# Patient Record
(demographics unavailable — no encounter records)

---

## 2024-10-08 NOTE — IMAGING
[de-identified] : Constitutional: well developed and well nourished, able to communicate\par  Cardiovascular: Peripheral vascular exam is grossly normal\par  Neurologic: Alert and oriented, no acute distress.\par  Skin: normal skin with no ulcers, rashes, or lesions\par  Pulmonary: No respiratory distress, breathing comfortably on room air\par  Lymphatics: No obvious lymphadenopathy or lymphedema in areas examined\par  \par  BILATERAL KNEE EXAM\par  Alignment: Neutral \par  Effusion: None\par  Atrophy: None                                                 \par  Stable to Varus/valgus stress\par  Posterior Drawer Test: negative\par  Anterior Drawer Test: Negative\par  Knee Extension/Flexion: 0 / 120\par  \par  Medial/lateral compartments\par  Medial joint line: No Tenderness\par  Lateral joint line: No Tenderness\par  Jayne test: negative\par  \par  Patellofemoral joint\par  Medial patellar facet: no tenderness\par  Patellar grind: Negative\par  \par  Tendons:\par  Pes Anserine: No tenderness\par  Gerdy's Tubercle/ IT Band: No tenderness\par  Quadriceps Tendon: No Tenderness\par  patellar tendon: no Tenderness\par  Tibial tubercle: not tenderness\par  Calf: no Tenderness\par  \par  Neurovascular exam\par  Muscle strength: 5/5\par  Sensation to light touch: intact\par  Distal pulses: 2+\par  \par  IMAGING:\par  06/16/2023 Xrays of the Left Knee were taken demonstrating medial compartment narrowing left knee compartment. PF OA

## 2024-10-08 NOTE — HISTORY OF PRESENT ILLNESS
[Gradual] : gradual [2] : 2 [de-identified] : 06/16/2023 Mr. CAMMY LINDSAY is a 66 year male that comes in today with a chief complaint of Fernandez knee pt states he had surgery on the left knee 10 years ago.  07/28/2023 follow up injection. Here for inj #1 fernandez knee 08/04/2023 follow up injection. #2 08/11/2023 follow up injection #3  08/18/2023 follow up injection #   07/25/2024 Pt is here for fu of Lt knee. Pt is having sharp pain anteriorly; says pain goes away when he is active/running. No numbness or tingling. Previous inj in fernandez knee; last inj 8/18/23.    09/10/2024: pt is here today for injection #1 fernandez knees orthovisc.  9/17/24 pt is here for injection #2 fernandez knees orthovisc.  10/01/2024: Bilateral knee orthovisc 3 of 4  10/8/24 pt is here for INJ orthovisc 4 of 4  [] : no

## 2024-10-08 NOTE — ASSESSMENT
[FreeTextEntry1] : 66 year M with L>R knee OA CSI in the past with hx of arthroscopy. will submit for bilateral gel injections orthovisc follow after approval  7/28/23: Orthovisc 1 of 4, follow up in one week for inj #2  8/4/23: Orthovisc 2 of 4, follow up in one week for inj # 3  8/11/23: Orthovisc 3 of 4, follow up in one week for inj #4  07/25/2024: Bilateral knee OA Orthovisc submitted auth Follow up once auth submitted  09/10/2024: Bilateral knee OA  Orthovisc 1 of 4 Follow up in one week for inj #2   09/17/2024: Bilateral knee OA Orthovisc 2 of 4 Follow up in one week for inj #3  10/01/2024: Bilateral knee OA Orthovisc 3 of 4 Follow up in one week for inj #4  10/08/2024: Bilateral knee OA Orthovisc 4 of 4 Follow up PRN

## 2024-10-08 NOTE — PROCEDURE
[FreeTextEntry3] : The risks, benefits and alternatives to the injection were discussed with the patient. The decision was made to proceed with the injection to reduce inflammation within the area. Verbal consent was obtained for the procedure. The bilateral knees were cleaned with alcohol and ethyl chloride was sprayed topically.  Orthovisc 4 of 4 was injected. The patient tolerated the procedure well and was instructed to ice the affected joint as needed later today. Activities can be performed as tolerated.

## 2024-11-12 NOTE — PHYSICAL EXAM
[General Appearance - Alert] : alert [General Appearance - In No Acute Distress] : in no acute distress [General Appearance - Well Nourished] : well nourished [General Appearance - Well Developed] : well developed [Sclera] : the sclera and conjunctiva were normal [PERRL With Normal Accommodation] : pupils were equal in size, round, and reactive to light [Outer Ear] : the ears and nose were normal in appearance [Hearing Threshold Finger Rub Not [place]] : hearing was normal [Examination Of The Oral Cavity] : the lips and gums were normal [Neck Appearance] : the appearance of the neck was normal [Thyroid Diffuse Enlargement] : the thyroid was not enlarged [Exaggerated Use Of Accessory Muscles For Inspiration] : no accessory muscle use [Respiration, Rhythm And Depth] : normal respiratory rhythm and effort [Auscultation Breath Sounds / Voice Sounds] : lungs were clear to auscultation bilaterally [Heart Rate And Rhythm] : heart rate was normal and rhythm regular [Heart Sounds] : normal S1 and S2 [Heart Sounds Gallop] : no gallops [Murmurs] : no murmurs [Arterial Pulses Carotid] : carotid pulses were normal with no bruits [Edema] : there was no peripheral edema [Bowel Sounds] : normal bowel sounds [Abdomen Soft] : soft [Abdomen Tenderness] : non-tender [No CVA Tenderness] : no ~M costovertebral angle tenderness [No Spinal Tenderness] : no spinal tenderness [Abnormal Walk] : normal gait [Nail Clubbing] : no clubbing  or cyanosis of the fingernails [Musculoskeletal - Swelling] : no joint swelling seen [Motor Tone] : muscle strength and tone were normal [Skin Color & Pigmentation] : normal skin color and pigmentation [] : no rash [Skin Lesions] : no skin lesions [Impaired Insight] : insight and judgment were intact [Oriented To Time, Place, And Person] : oriented to person, place, and time [Affect] : the affect was normal [Mood] : the mood was normal

## 2024-11-12 NOTE — PHYSICAL EXAM
[General Appearance - Alert] : alert [General Appearance - In No Acute Distress] : in no acute distress [General Appearance - Well Nourished] : well nourished [General Appearance - Well Developed] : well developed [Sclera] : the sclera and conjunctiva were normal [PERRL With Normal Accommodation] : pupils were equal in size, round, and reactive to light [Outer Ear] : the ears and nose were normal in appearance [Hearing Threshold Finger Rub Not [place]] : hearing was normal [Examination Of The Oral Cavity] : the lips and gums were normal [Neck Appearance] : the appearance of the neck was normal [Thyroid Diffuse Enlargement] : the thyroid was not enlarged [Respiration, Rhythm And Depth] : normal respiratory rhythm and effort [Exaggerated Use Of Accessory Muscles For Inspiration] : no accessory muscle use [Auscultation Breath Sounds / Voice Sounds] : lungs were clear to auscultation bilaterally [Heart Rate And Rhythm] : heart rate was normal and rhythm regular [Heart Sounds] : normal S1 and S2 [Heart Sounds Gallop] : no gallops [Murmurs] : no murmurs [Arterial Pulses Carotid] : carotid pulses were normal with no bruits [Edema] : there was no peripheral edema [Bowel Sounds] : normal bowel sounds [Abdomen Soft] : soft [Abdomen Tenderness] : non-tender [No CVA Tenderness] : no ~M costovertebral angle tenderness [No Spinal Tenderness] : no spinal tenderness [Abnormal Walk] : normal gait [Nail Clubbing] : no clubbing  or cyanosis of the fingernails [Musculoskeletal - Swelling] : no joint swelling seen [Motor Tone] : muscle strength and tone were normal [Skin Color & Pigmentation] : normal skin color and pigmentation [] : no rash [Skin Lesions] : no skin lesions [Impaired Insight] : insight and judgment were intact [Oriented To Time, Place, And Person] : oriented to person, place, and time [Affect] : the affect was normal [Mood] : the mood was normal

## 2024-11-12 NOTE — ASSESSMENT
[FreeTextEntry1] : Mr. Reyna returns for follow up CKD. He is a 68 yo male with DM, HTN, CKD 3a.  1. CKD: He has CKD 3a likely secondary to long-standing DM, and HTN.   2. RASHEED - He now has RASHEED with Cr 1.6 recently; likely hemodynamic mediated on the setting of volume depletion, lisinopril use, and relative hypotension. Renal US without hydronephrosis.  - Will repeat CMP today as he has been off of lisinopril    2. Proteinuria: In the setting of longstanding DM, HTN and obesity though he has lost 55 Ibs in the past few months and has had wide weight fluctuations. Lisinopril recently stopped due to orthostasis and hypotension. - check uPCR. UA - advised maintaining normal BMI  3. HTN: He was on lisinopril but recently stopped due to symptomatic hypotension likely in the setting of significant weight loss. - advised to monitor home BP  - advised maintaining normal BMI  4. Renal cysts: Apparently had renal US which showed bilateral renal cysts with the left kidney cyst at 4.2 cm. - advised the patient to discuss results with his urologist.  F/U in 6 months

## 2024-11-12 NOTE — ASSESSMENT
[FreeTextEntry1] : Mr. Reyna returns for follow up CKD. He is a 66 yo male with DM, HTN, CKD 3a.  1. CKD: He has CKD 3a likely secondary to long-standing DM, and HTN.   2. RASHEED - He now has RASHEED with Cr 1.6 recently; likely hemodynamic mediated on the setting of volume depletion, lisinopril use, and relative hypotension. Renal US without hydronephrosis.  - Will repeat CMP today as he has been off of lisinopril    2. Proteinuria: In the setting of longstanding DM, HTN and obesity though he has lost 55 Ibs in the past few months and has had wide weight fluctuations. Lisinopril recently stopped due to orthostasis and hypotension. - check uPCR. UA - advised maintaining normal BMI  3. HTN: He was on lisinopril but recently stopped due to symptomatic hypotension likely in the setting of significant weight loss. - advised to monitor home BP  - advised maintaining normal BMI  4. Renal cysts: Apparently had renal US which showed bilateral renal cysts with the left kidney cyst at 4.2 cm. - advised the patient to discuss results with his urologist.  F/U in 6 months

## 2024-11-12 NOTE — REVIEW OF SYSTEMS
[Negative] : Heme/Lymph [Heart Rate Is Slow] : the heart rate was not slow [Heart Rate Is Fast] : the heart rate was not fast [Chest Pain] : no chest pain [Palpitations] : no palpitations [Lower Ext Edema] : no extremity edema [FreeTextEntry5] : orthostasis resolved after stopping lisiniopril

## 2024-11-12 NOTE — HISTORY OF PRESENT ILLNESS
[FreeTextEntry1] : Follow up CKD/ proteinuria    68 yo male diagnosed with DM ( for only 2 years, no retinopathy, no neuropathy and labs much improved after significant weight loss), HTN, Hyperlipidemia. His creatinine has fluctuated between 1.2-1.5 mg/dl depending on his intake of Advil/ volume status and intermittent fasting.   USG showed a right kidney- 9.9 cm by 6.3 cm, a small cyst in the mid-right kidney measuring 0.8 by 0.6 by 0.5 cm. + a stone in the upper pole of the left kidney. 0.4 by 0.4 by 0.3 cm, left Kidney- 8.5 cm by 3.5 cm.  At the last visit, he had not seen me for about a year. He was in [place] for 2 weeks and gained a significant amount of weight. Additionally, he did not take his antihypertensives on vacation and today is his first day of taking them.   He saw his PCP in March and got labs - Creatinine was 1.2 mg/dl. Overall feels well. has started running again and feels good. has lost ~15 lbs.  Since the last visit: He has restricted his calorie intake, eating only once a day, and has lost 55 lbs  He admits that he may have been "dehydrated" at times He has had episodes of orthostasis and hypotension on lisinopril  Lisinopril stopped 3 weeks ago Uses NSAIDs ~ 2-3x/year  He was also diagnosed with BPH, started Started Alfuzosin 4 days ago Denies acute symptoms, complaints, acute illness, hospitalizations   11/2024:  New BPH diagnosis  Nocturia1-2/night    Off of lisinopril 3 weeks ago due to hypotension, orthostasis Ozempic   9/2024 Cr 1.59, eGFR 47% - (1.25 in 3/2024) uPCR 148 UA - 1+ protein, no blood, 2+ ketones  Renal US - 9/13//2024 ([facility]) Right kidney 12.3, left kidney 10.8, no hydro, mass, calculus bilaterally, 1.1 cm cyst in right kidney, 4.2 cm cyst in left kidney

## 2024-11-12 NOTE — HISTORY OF PRESENT ILLNESS
[FreeTextEntry1] : Follow up CKD/ proteinuria    68 yo male diagnosed with DM ( for only 2 years, no retinopathy, no neuropathy and labs much improved after significant weight loss), HTN, Hyperlipidemia. His creatinine has fluctuated between 1.2-1.5 mg/dl depending on his intake of Advil/ volume status and intermittent fasting.   USG showed a right kidney- 9.9 cm by 6.3 cm, a small cyst in the mid-right kidney measuring 0.8 by 0.6 by 0.5 cm. + a stone in the upper pole of the left kidney. 0.4 by 0.4 by 0.3 cm, left Kidney- 8.5 cm by 3.5 cm.  At the last visit, he had not seen me for about a year. He was in [place] for 2 weeks and gained a significant amount of weight. Additionally, he did not take his antihypertensives on vacation and today is his first day of taking them.   He saw his PCP in March and got labs - Creatinine was 1.2 mg/dl. Overall feels well. has started running again and feels good. has lost ~15 lbs.  Since the last visit: He has restricted his calorie intake, eating only once a day, and has lost 55 lbs  He admits that he may have been "dehydrated" at times He has had episodes of orthostasis and hypotension on lisinopril  Lisinopril stopped 3 weeks ago Uses NSAIDs ~ 2-3x/year  He was also diagnosed with BPH, started Started Alfuzosin 4 days ago Denies acute symptoms, complaints, acute illness, hospitalizations   11/2024:  New BPH diagnosis  Nocturia1-2/night    Off of lisinopril 3 weeks ago due to hypotension, orthostasis Ozempic   9/2024 Cr 1.59, eGFR 47% - (1.25 in 3/2024) uPCR 148 UA - 1+ protein, no blood, 2+ ketones  Renal US - 9/13//2024 ([facility]) Right kidney 12.3, left kidney 10.8, no hydro, mass, calculus bilaterally, 1.1 cm cyst in right kidney, 4.2 cm cyst in left kidney

## 2025-03-07 NOTE — CONSULT LETTER
[Dear  ___] : Dear  [unfilled], [Consult Letter:] : I had the pleasure of evaluating your patient, [unfilled]. [Please see my note below.] : Please see my note below. [Consult Closing:] : Thank you very much for allowing me to participate in the care of this patient.  If you have any questions, please do not hesitate to contact me. [Sincerely,] : Sincerely, [FreeTextEntry3] : Yadira Crespo MD Otolaryngology and Cranial Base Surgery Attending Physician - Department of Otolaryngology and Head & Neck Surgery [facility]  [employer] at [facility]

## 2025-03-07 NOTE — END OF VISIT
[FreeTextEntry3] : I personally saw and examined Mr. CAMMY LINDSAY in detail this visit today. I personally reviewed the HPI, PMH, FH, SH, ROS and medications/allergies. I have spoken to IZABELA Clay regarding the history and have personally determined the assessment and plan of care, and documented this myself. I was present and participated in all key portions of the encounter and all procedures noted above. I have made changes in the body of the note where appropriate.   Attesting Faculty: See Attending Signature Below

## 2025-03-07 NOTE — HISTORY OF PRESENT ILLNESS
[de-identified] : 69y/o male who came in with hx of wax build up and SNHL is here to have his ears cleaned. He had a hearing test within the past year.  Pt denies any ear pain, drainage, tinnitus or worsening hearing loss.

## 2025-03-07 NOTE — ASSESSMENT
[FreeTextEntry1] : 69y/o male who came in with hx of wax build up and SNHL is here to have his ears cleaned  Ear exam: Bilateral wax -Wax removed from bilateral ear canals -Hearing is stable defers audio, last audio with in the year -F/u in 6 months

## 2025-03-07 NOTE — PROCEDURE
[FreeTextEntry3] : Cerumen impaction removed with curette and forceps. After removal of cerumen canal noted to be normal without edema, purulence or inflammation. Patient tolerated procedure well.

## 2025-04-01 NOTE — HISTORY OF PRESENT ILLNESS
[de-identified] : Date of Injury/Onset: 1 month ago Pain: At Rest: 1 With Activity: 4 Mechanism of injury: This is NOT a Work Related Injury being treated under Worker's Compensation. This is NOT an athletic injury occurring associated with an interscholastic or organized sports team. Quality of symptoms: Improves with: Worse with: Prior treatment: Prior Imaging: n/a Reports Available For Review Today: no Out of work/sport: working School/Sport/Position/Occupation:   04/01/2025 CAMMY he is here today for evaluation of left wrist pain for over 1 month ago after lifting some heavy box (50lbs). This is his first evaluation. Patient denies N/T or weakness. Pain indicated to dorsal aspect of left wrist, 5/10, intermittent, achy and sharp at times. Worsening symptoms when flexing or bending hand to point affecting quality of life.   Patient had been wearing a wrist brace, current on steroid pack for gout flare up recent lost >100lbs, planned weight loss.  pmhx gout, htn, low testosterone, HLD, DM (diet only), r

## 2025-04-01 NOTE — DISCUSSION/SUMMARY
[de-identified] : 68 y M RHD L wrist pain x 4 weeks Rec: MR L wrist, cock up wrist splint, mobnathen  FU with Dr. Ontiveros or Armando

## 2025-05-21 NOTE — HISTORY OF PRESENT ILLNESS
[de-identified] : 5/21/25: 69yo male (RHD. ) presents for LEFT wrist pain after lifting a 50lb box ~2 months ago. Saw Dr Estevez on 4/1/25 => XR of L wrist, ordered MRI, wrist brace, Mobic. Pain has improved, c/o pain with heavy lifting.  MRI of L wrist @[facility] 5/2/25 - IMPRESSION: - Full thickness tear of the dorsal scapholunate ligament component without widening of the scapholunate interval. - Full thickness tear of the TFC disc. - Large distal radioulnar joint effusion.  Hx: T2DM - diet controlled. CKD. HLD. Gout. Low testosterone. Sx: L knee PMM (Dr Domingo Rivera 2019). [FreeTextEntry5] : CAMMY hanson [RHD] 68 year old male is here today for evaluation of LEFT wrist pain x 2 months after lifting a 50lbs box. saw Herb 04/01/25 +xrays and MRI @[facility]. wore brace until 2 weeks ago. c/o pain at dorsal wrist with flexion and extension.

## 2025-05-21 NOTE — ASSESSMENT
[FreeTextEntry1] : I independently reviewed and interpreted outside MRI of L wrist - significant for scapholunate ligament tear with DISI deformity. The condition was explained to the patient. Symptoms have improved and are tolerable at this time. Patient declined CSI for pain relief.  - wrist brace PRN pain. - pain guided activity modification. - OTC pain medication PRN pain.  F/u PRN.

## 2025-05-21 NOTE — IMAGING
[de-identified] : LEFT HAND skin intact. mild swelling of wrist. no TTP. wrist ROM: limited extension, flexion. good pronation, supination. + pain with axial load in extension. pain at end flexion. good EPL, FPL. fingers good extension, flex to full fist. good finger abduction and adduction. SILT to median, ulnar, radial distribution. brisk cap refill all digits. no triggering.  Pena's maneuver => no pain. no instability.  @4/1/25 XRAYS OF LEFT WRIST (3 views - PA, OBLIQUE, AND LATERAL VIEWS): no acute displaced fracture or dislocation. DISI deformity. small calcification adjacent to ulnar styloid.

## 2025-05-23 NOTE — PHYSICAL EXAM
[General Appearance - Alert] : alert [General Appearance - In No Acute Distress] : in no acute distress [General Appearance - Well Nourished] : well nourished [General Appearance - Well Developed] : well developed [Sclera] : the sclera and conjunctiva were normal [PERRL With Normal Accommodation] : pupils were equal in size, round, and reactive to light [Outer Ear] : the ears and nose were normal in appearance [Hearing Threshold Finger Rub Not [place]] : hearing was normal [Examination Of The Oral Cavity] : the lips and gums were normal [Neck Appearance] : the appearance of the neck was normal [Thyroid Diffuse Enlargement] : the thyroid was not enlarged [Respiration, Rhythm And Depth] : normal respiratory rhythm and effort [Exaggerated Use Of Accessory Muscles For Inspiration] : no accessory muscle use [Auscultation Breath Sounds / Voice Sounds] : lungs were clear to auscultation bilaterally [Heart Rate And Rhythm] : heart rate was normal and rhythm regular [Heart Sounds] : normal S1 and S2 [Heart Sounds Gallop] : no gallops [Murmurs] : no murmurs [Arterial Pulses Carotid] : carotid pulses were normal with no bruits [Edema] : there was no peripheral edema [Bowel Sounds] : normal bowel sounds [Abdomen Soft] : soft [Abdomen Tenderness] : non-tender [No CVA Tenderness] : no ~M costovertebral angle tenderness [No Spinal Tenderness] : no spinal tenderness [Abnormal Walk] : normal gait [Nail Clubbing] : no clubbing  or cyanosis of the fingernails [Musculoskeletal - Swelling] : no joint swelling seen [Motor Tone] : muscle strength and tone were normal [Skin Color & Pigmentation] : normal skin color and pigmentation [] : no rash [Skin Lesions] : no skin lesions [Oriented To Time, Place, And Person] : oriented to person, place, and time [Impaired Insight] : insight and judgment were intact [Affect] : the affect was normal [Mood] : the mood was normal

## 2025-05-23 NOTE — HISTORY OF PRESENT ILLNESS
[FreeTextEntry1] : Follow up CKD/ proteinuria    68 year old male diagnosed with DM ( for only 2 years, no retinopathy, no neuropathy and labs much improved after significant weight loss), HTN, Hyperlipidemia. His creatinine has fluctuated between 1.2-1.5 mg/dl depending on his intake of Advil/ volume status and intermittent fasting.   USG showed a right kidney- 9.9 cm by 6.3 cm, a small cyst in the mid-right kidney measuring 0.8 by 0.6 by 0.5 cm. + a stone in the upper pole of the left kidney. 0.4 by 0.4 by 0.3 cm, left Kidney- 8.5 cm by 3.5 cm.  At the last visit, he had not seen me for about a year. He was in [place] for 2 weeks and gained a significant amount of weight. Additionally, he did not take his antihypertensives on vacation and today is his first day of taking them.   He saw his PCP in March and got labs - Creatinine was 1.2 mg/dl. Overall feels well. has started running again and feels good. has lost ~15 lbs.  Since the last visit: He has restricted his calorie intake, eating only once a day, and has lost 55 lbs  He admits that he may have been "dehydrated" at times He has had episodes of orthostasis and hypotension on lisinopril  Lisinopril stopped 3 weeks ago Uses NSAIDs ~ 2-3x/year  He was also diagnosed with BPH, started Started Alfuzosin 4 days ago Denies acute symptoms, complaints, acute illness, hospitalizations   11/2024:  New BPH diagnosis  Nocturia1-2/night  Off of lisinopril 3 weeks ago due to hypotension, orthostasis Ozempic   9/2024 Cr 1.59, eGFR 47% - (1.25 in 3/2024) uPCR 148 UA - 1+ protein, no blood, 2+ ketones  Renal US - 9/13//2024 ([facility]) Right kidney 12.3, left kidney 10.8, no hydro, mass, calculus bilaterally, 1.1 cm cyst in right kidney, 4.2 cm cyst in left kidney   5/23/2025   feels well  recent gout attacks- now on allopurinol

## 2025-05-23 NOTE — REVIEW OF SYSTEMS
[Heart Rate Is Slow] : the heart rate was not slow [Heart Rate Is Fast] : the heart rate was not fast [Chest Pain] : no chest pain [Palpitations] : no palpitations [Lower Ext Edema] : no extremity edema [Negative] : Heme/Lymph [FreeTextEntry5] : orthostasis resolved after stopping lisiniopril